# Patient Record
Sex: FEMALE | Race: BLACK OR AFRICAN AMERICAN | NOT HISPANIC OR LATINO | URBAN - METROPOLITAN AREA
[De-identification: names, ages, dates, MRNs, and addresses within clinical notes are randomized per-mention and may not be internally consistent; named-entity substitution may affect disease eponyms.]

---

## 2017-05-16 ENCOUNTER — EMERGENCY (EMERGENCY)
Facility: HOSPITAL | Age: 32
LOS: 1 days | Discharge: HOME | End: 2017-05-16
Admitting: EMERGENCY MEDICINE
Payer: MEDICAID

## 2017-05-16 VITALS
OXYGEN SATURATION: 96 % | RESPIRATION RATE: 18 BRPM | SYSTOLIC BLOOD PRESSURE: 115 MMHG | HEART RATE: 85 BPM | TEMPERATURE: 99 F | DIASTOLIC BLOOD PRESSURE: 62 MMHG

## 2017-05-16 LAB — HCG SERPL-ACNC: < 5 MIU/ML — SIGNIFICANT CHANGE UP

## 2017-05-16 PROCEDURE — 73610 X-RAY EXAM OF ANKLE: CPT | Mod: 26,LT

## 2017-05-16 PROCEDURE — 99284 EMERGENCY DEPT VISIT MOD MDM: CPT

## 2017-05-16 RX ORDER — ACETAMINOPHEN 500 MG
650 TABLET ORAL ONCE
Qty: 0 | Refills: 0 | Status: COMPLETED | OUTPATIENT
Start: 2017-05-16 | End: 2017-05-16

## 2017-05-16 RX ORDER — KETOROLAC TROMETHAMINE 30 MG/ML
15 SYRINGE (ML) INJECTION ONCE
Qty: 0 | Refills: 0 | Status: DISCONTINUED | OUTPATIENT
Start: 2017-05-16 | End: 2017-05-16

## 2017-05-16 RX ADMIN — Medication 650 MILLIGRAM(S): at 13:18

## 2017-05-16 NOTE — ED PROVIDER NOTE - CHPI ED SYMPTOMS NEG
no back pain/no stiffness/no tingling/no bruising/no abrasion/no numbness/no fever/no weakness/no deformity

## 2017-05-16 NOTE — ED PROVIDER NOTE - MEDICAL DECISION MAKING DETAILS
32 yo F here for L ankle pain x two days. +UCG in ED. Pt states LMP was at the end of april and there is no way she could be pregnant. Will do betaHCG to eval. PLAN: beta, XR, meds

## 2017-05-16 NOTE — ED PROVIDER NOTE - PHYSICAL EXAMINATION
L ankle: no swelling mild ttp to medial malleolus NVI sensate intact FROM strength 5/5 able to bear weight with minimal discomfort.

## 2017-05-16 NOTE — ED PROVIDER NOTE - PLAN OF CARE
Use aircast for the next 2-3 days for support. RICE: rest, ice, compression, elevation. Rest, drink plenty of fluids.  Advance activity as tolerated.  Continue all previously prescribed medications as directed. You can use motrin 600mg every 6-8 hours for pain or fever, and/or Tylenol 650 mg every 4 hours for pain/fever. Follow up with your primary care physician in 48-72 hours- bring copies of your results.  Return to the emergency department for chest pain, shortness of breath, dizziness, or worsening, concerning, or persistent symptoms.

## 2017-05-16 NOTE — ED PROVIDER NOTE - PROGRESS NOTE DETAILS
TIFFANIE Bryant: XR neg, HCG serum negative. Will d/c with ankle sprain instructions, PMD follow up, aircast x 2-3 days.

## 2017-05-16 NOTE — ED PROVIDER NOTE - CARE PLAN
Principal Discharge DX:	Ankle sprain  Instructions for follow-up, activity and diet:	Use aircast for the next 2-3 days for support. RICE: rest, ice, compression, elevation. Rest, drink plenty of fluids.  Advance activity as tolerated.  Continue all previously prescribed medications as directed. You can use motrin 600mg every 6-8 hours for pain or fever, and/or Tylenol 650 mg every 4 hours for pain/fever. Follow up with your primary care physician in 48-72 hours- bring copies of your results.  Return to the emergency department for chest pain, shortness of breath, dizziness, or worsening, concerning, or persistent symptoms.

## 2018-10-11 ENCOUNTER — RESULT REVIEW (OUTPATIENT)
Age: 33
End: 2018-10-11

## 2021-12-10 ENCOUNTER — APPOINTMENT (OUTPATIENT)
Dept: OBGYN | Facility: CLINIC | Age: 36
End: 2021-12-10
Payer: COMMERCIAL

## 2021-12-10 VITALS — SYSTOLIC BLOOD PRESSURE: 108 MMHG | DIASTOLIC BLOOD PRESSURE: 71 MMHG | BODY MASS INDEX: 35.12 KG/M2 | WEIGHT: 192 LBS

## 2021-12-10 DIAGNOSIS — Z34.90 ENCOUNTER FOR SUPERVISION OF NORMAL PREGNANCY, UNSPECIFIED, UNSPECIFIED TRIMESTER: ICD-10-CM

## 2021-12-10 PROCEDURE — 76801 OB US < 14 WKS SINGLE FETUS: CPT

## 2021-12-10 PROCEDURE — 99204 OFFICE O/P NEW MOD 45 MIN: CPT

## 2021-12-15 PROBLEM — Z34.90 CURRENTLY PREGNANT: Status: ACTIVE | Noted: 2021-12-10

## 2021-12-15 NOTE — DISCUSSION/SUMMARY
[FreeTextEntry1] : 36  at 11 weeks by LMP found to have missed  \par -patient counseled on options including expectant, medical and surgical management. Risks and benefits of each option reviewed in detail\par -Patient unsure of her decision and will discuss with her partner\par -Infection and bleeding precautions given\par -Patient advised to f/u within 1-2 weeks regarding her decision

## 2021-12-15 NOTE — HISTORY OF PRESENT ILLNESS
[Patient reported PAP Smear was normal] : Patient reported PAP Smear was normal [PapSmeardate] : 11/22/2021 [LMPDate] : 9/21/21 [FreeTextEntry1] :  - , full term, female

## 2021-12-16 ENCOUNTER — OUTPATIENT (OUTPATIENT)
Dept: OUTPATIENT SERVICES | Facility: HOSPITAL | Age: 36
LOS: 1 days | End: 2021-12-16

## 2021-12-16 ENCOUNTER — TRANSCRIPTION ENCOUNTER (OUTPATIENT)
Age: 36
End: 2021-12-16

## 2021-12-16 VITALS
OXYGEN SATURATION: 98 % | HEIGHT: 62 IN | SYSTOLIC BLOOD PRESSURE: 103 MMHG | DIASTOLIC BLOOD PRESSURE: 61 MMHG | RESPIRATION RATE: 14 BRPM | WEIGHT: 188.05 LBS | HEART RATE: 70 BPM | TEMPERATURE: 97 F

## 2021-12-16 VITALS
TEMPERATURE: 97 F | WEIGHT: 188.05 LBS | SYSTOLIC BLOOD PRESSURE: 103 MMHG | RESPIRATION RATE: 14 BRPM | HEIGHT: 62 IN | DIASTOLIC BLOOD PRESSURE: 61 MMHG | HEART RATE: 70 BPM | OXYGEN SATURATION: 98 %

## 2021-12-16 DIAGNOSIS — O02.1 MISSED ABORTION: ICD-10-CM

## 2021-12-16 DIAGNOSIS — Z98.890 OTHER SPECIFIED POSTPROCEDURAL STATES: Chronic | ICD-10-CM

## 2021-12-16 LAB
HCT VFR BLD CALC: 38.2 % — SIGNIFICANT CHANGE UP (ref 34.5–45)
HGB BLD-MCNC: 12.6 G/DL — SIGNIFICANT CHANGE UP (ref 11.5–15.5)
MCHC RBC-ENTMCNC: 30 PG — SIGNIFICANT CHANGE UP (ref 27–34)
MCHC RBC-ENTMCNC: 33 GM/DL — SIGNIFICANT CHANGE UP (ref 32–36)
MCV RBC AUTO: 91 FL — SIGNIFICANT CHANGE UP (ref 80–100)
NRBC # BLD: 0 /100 WBCS — SIGNIFICANT CHANGE UP
NRBC # FLD: 0 K/UL — SIGNIFICANT CHANGE UP
PLATELET # BLD AUTO: 204 K/UL — SIGNIFICANT CHANGE UP (ref 150–400)
RBC # BLD: 4.2 M/UL — SIGNIFICANT CHANGE UP (ref 3.8–5.2)
RBC # FLD: 13.4 % — SIGNIFICANT CHANGE UP (ref 10.3–14.5)
WBC # BLD: 4.99 K/UL — SIGNIFICANT CHANGE UP (ref 3.8–10.5)
WBC # FLD AUTO: 4.99 K/UL — SIGNIFICANT CHANGE UP (ref 3.8–10.5)

## 2021-12-16 RX ORDER — SODIUM CHLORIDE 9 MG/ML
3 INJECTION INTRAMUSCULAR; INTRAVENOUS; SUBCUTANEOUS ONCE
Refills: 0 | Status: DISCONTINUED | OUTPATIENT
Start: 2021-12-17 | End: 2022-01-01

## 2021-12-16 RX ORDER — SODIUM CHLORIDE 9 MG/ML
1000 INJECTION, SOLUTION INTRAVENOUS
Refills: 0 | Status: DISCONTINUED | OUTPATIENT
Start: 2021-12-17 | End: 2022-01-01

## 2021-12-16 NOTE — H&P PST ADULT - ATTENDING COMMENTS
OB Attending    P2 presenting for DVC for MAB.   Written informed consent obtained prior to procedure.     IMAN Flores MD

## 2021-12-16 NOTE — H&P PST ADULT - PROBLEM SELECTOR PLAN 1
Assessment and Plan: Patient scheduled for surgery on 12/17/21  Patient provided with verbal and written presurgical instructions; verbalized understanding  with teach back.    Patient confirmed  COVID appointment yesterday

## 2021-12-16 NOTE — H&P PST ADULT - HISTORY OF PRESENT ILLNESS
36 yr old female presents with preop dx missed  scheduled for dilation vacuum curettage, reports no fetal heart beat noted at 11 week sono, gestation age estimated 9 weeks

## 2021-12-16 NOTE — H&P PST ADULT - NSICDXFAMILYHX_GEN_ALL_CORE_FT
FAMILY HISTORY:  Father  Still living? No  Family history of diabetes mellitus (DM), Age at diagnosis: Age Unknown  FH: heart attack, Age at diagnosis: Age Unknown  FH: heart disease, Age at diagnosis: Age Unknown  FH: pancreatic cancer, Age at diagnosis: Age Unknown  FH: stroke, Age at diagnosis: Age Unknown    Mother  Still living? Yes, Estimated age: Age Unknown  Family history of diabetes mellitus (DM), Age at diagnosis: Age Unknown    Sibling  Still living? Yes, Estimated age: Age Unknown  FH: breast cancer, Age at diagnosis: Age Unknown    Grandparent  Still living? No  FH: pancreatic cancer, Age at diagnosis: Age Unknown

## 2021-12-16 NOTE — H&P PST ADULT - NSICDXPASTMEDICALHX_GEN_ALL_CORE_FT
PAST MEDICAL HISTORY:  HSV-1 (herpes simplex virus 1) infection     HSV-2 infection     Missed

## 2021-12-16 NOTE — H&P PST ADULT - NSANTHOSAYNRD_GEN_A_CORE
No. REMBERTO screening performed.  STOP BANG Legend: 0-2 = LOW Risk; 3-4 = INTERMEDIATE Risk; 5-8 = HIGH Risk

## 2021-12-16 NOTE — ASU PREOPERATIVE ASSESSMENT, ADULT ([facility]ARK ONLY) - FALL HARM RISK - UNIVERSAL INTERVENTIONS
Bed in lowest position, wheels locked, appropriate side rails in place/Call bell, personal items and telephone in reach/Instruct patient to call for assistance before getting out of bed or chair/Non-slip footwear when patient is out of bed/[place] to call system/Physically safe environment - no spills, clutter or unnecessary equipment/Purposeful Proactive Rounding/Room/bathroom lighting operational, light cord in reach

## 2021-12-17 ENCOUNTER — RESULT REVIEW (OUTPATIENT)
Age: 36
End: 2021-12-17

## 2021-12-17 ENCOUNTER — OUTPATIENT (OUTPATIENT)
Dept: OUTPATIENT SERVICES | Facility: HOSPITAL | Age: 36
LOS: 1 days | End: 2021-12-17
Payer: COMMERCIAL

## 2021-12-17 ENCOUNTER — APPOINTMENT (OUTPATIENT)
Dept: OBGYN | Facility: HOSPITAL | Age: 36
End: 2021-12-17

## 2021-12-17 ENCOUNTER — OUTPATIENT (OUTPATIENT)
Dept: OUTPATIENT SERVICES | Facility: HOSPITAL | Age: 36
LOS: 1 days | Discharge: HOME | End: 2021-12-17
Payer: COMMERCIAL

## 2021-12-17 VITALS
RESPIRATION RATE: 14 BRPM | OXYGEN SATURATION: 100 % | DIASTOLIC BLOOD PRESSURE: 57 MMHG | HEART RATE: 81 BPM | SYSTOLIC BLOOD PRESSURE: 109 MMHG

## 2021-12-17 DIAGNOSIS — O02.1 MISSED ABORTION: ICD-10-CM

## 2021-12-17 DIAGNOSIS — Z98.890 OTHER SPECIFIED POSTPROCEDURAL STATES: Chronic | ICD-10-CM

## 2021-12-17 LAB
BLD GP AB SCN SERPL QL: NEGATIVE — SIGNIFICANT CHANGE UP
RH IG SCN BLD-IMP: POSITIVE — SIGNIFICANT CHANGE UP

## 2021-12-17 PROCEDURE — 88305 TISSUE EXAM BY PATHOLOGIST: CPT | Mod: 26

## 2021-12-17 PROCEDURE — 88264 CHROMOSOME ANALYSIS 20-25: CPT

## 2021-12-17 PROCEDURE — 88233 TISSUE CULTURE SKIN/BIOPSY: CPT

## 2021-12-17 PROCEDURE — 59840 INDUCED ABORTION D&C: CPT

## 2021-12-17 PROCEDURE — 88280 CHROMOSOME KARYOTYPE STUDY: CPT

## 2021-12-17 RX ORDER — FAMOTIDINE 10 MG/ML
20 INJECTION INTRAVENOUS
Qty: 0 | Refills: 0 | DISCHARGE

## 2021-12-17 RX ORDER — SODIUM CHLORIDE 9 MG/ML
1000 INJECTION, SOLUTION INTRAVENOUS
Refills: 0 | Status: DISCONTINUED | OUTPATIENT
Start: 2021-12-17 | End: 2022-01-01

## 2021-12-17 NOTE — BRIEF OPERATIVE NOTE - NSICDXBRIEFPROCEDURE_GEN_ALL_CORE_FT
PROCEDURES:  D&C, uterus, therapeutic, for incomplete, missed, septic, or induced  17-Dec-2021 16:29:51  Sample-Maria Esther Patino

## 2021-12-17 NOTE — ASU DISCHARGE PLAN (ADULT/PEDIATRIC) - NS MD DC FALL RISK RISK
For information on Fall & Injury Prevention, visit: [email or web address] OR  [email or web address] OR  https://www.cdc.gov/steadi/patient.html

## 2021-12-17 NOTE — ASU DISCHARGE PLAN (ADULT/PEDIATRIC) - NURSING INSTRUCTIONS
You received IV Tylenol for pain management at 3:55pm. Please DO NOT take any Tylenol (Acetaminophen) containing products, such as Vicodin, Percocet, Excedrin, and cold medications for the next 6 hours (until 9:55pm). DO NOT TAKE MORE THAN 3000 MG OF TYLENOL in a 24 hour period.     You received IV Toradol for pain management at 4:15pm. Please DO NOT take Motrin/Ibuprofen/Advil/Aleve/NSAIDs (Non-Steroidal Anti-Inflammatory Drugs) for the next 6 hours (until 10:15 PM).

## 2021-12-17 NOTE — ASU DISCHARGE PLAN (ADULT/PEDIATRIC) - CARE PROVIDER_API CALL
Maria Esther Monteiro; MPH)  Erica MCCOY  [address], [address]  [place] [address]  Phone: [phone]  Fax: [phone]  Follow Up Time:

## 2021-12-21 LAB — SURGICAL PATHOLOGY STUDY: SIGNIFICANT CHANGE UP

## 2021-12-28 PROBLEM — O02.1 MISSED ABORTION: Chronic | Status: ACTIVE | Noted: 2021-12-16

## 2022-01-03 ENCOUNTER — APPOINTMENT (OUTPATIENT)
Dept: OBGYN | Facility: CLINIC | Age: 37
End: 2022-01-03
Payer: COMMERCIAL

## 2022-01-03 ENCOUNTER — TRANSCRIPTION ENCOUNTER (OUTPATIENT)
Age: 37
End: 2022-01-03

## 2022-01-03 VITALS — SYSTOLIC BLOOD PRESSURE: 125 MMHG | DIASTOLIC BLOOD PRESSURE: 86 MMHG | WEIGHT: 192 LBS | BODY MASS INDEX: 35.12 KG/M2

## 2022-01-03 DIAGNOSIS — O02.1 MISSED ABORTION: ICD-10-CM

## 2022-01-03 DIAGNOSIS — Z98.890 OTHER SPECIFIED POSTPROCEDURAL STATES: ICD-10-CM

## 2022-01-03 PROCEDURE — 99024 POSTOP FOLLOW-UP VISIT: CPT

## 2022-01-04 PROBLEM — Z98.890 POST-OPERATIVE STATE: Status: ACTIVE | Noted: 2022-01-04

## 2022-01-04 PROBLEM — O02.1 MISSED ABORTION: Status: ACTIVE | Noted: 2022-01-04

## 2022-01-04 NOTE — PLAN
[FreeTextEntry1] : 35 y/o F presenting for post operative check, s/p MAB\par -Patient recovering well\par -She is cleared to resume normal activity including sexual intercourse and exercise\par -Patient given information to contact [facility] for preconception counseling given poor OB history\par -will call patient once genetics are resulted\par -patient not ready to conceive but declines contraception at this time\par -f/u for annual visit

## 2022-01-04 NOTE — HISTORY OF PRESENT ILLNESS
[Pain is well-controlled] : pain is well-controlled [None] : no vaginal bleeding [Normal] : normal [de-identified] : Patient is a 37 y/o F s/p DVC on  for Missed . Patient is feeling well today. Pain well controlled, ambulating, voiding, tolerating PO. Denies subjective fevers and chills. Patient feeling emotionally well. \par \par Pathology Reviewed: Products of Conception\par Genetics: Pending

## 2022-01-10 LAB — CHROM ANALY OVERALL INTERP SPEC-IMP: SIGNIFICANT CHANGE UP

## 2022-05-18 ENCOUNTER — APPOINTMENT (OUTPATIENT)
Dept: ANTEPARTUM | Facility: CLINIC | Age: 37
End: 2022-05-18

## 2022-05-18 ENCOUNTER — ASOB RESULT (OUTPATIENT)
Age: 37
End: 2022-05-18

## 2022-05-18 ENCOUNTER — APPOINTMENT (OUTPATIENT)
Dept: MATERNAL FETAL MEDICINE | Facility: CLINIC | Age: 37
End: 2022-05-18
Payer: COMMERCIAL

## 2022-05-18 PROCEDURE — 99203 OFFICE O/P NEW LOW 30 MIN: CPT

## 2022-05-18 PROCEDURE — 36416 COLLJ CAPILLARY BLOOD SPEC: CPT

## 2022-05-18 PROCEDURE — 99213 OFFICE O/P EST LOW 20 MIN: CPT

## 2022-05-18 PROCEDURE — 76813 OB US NUCHAL MEAS 1 GEST: CPT

## 2022-05-18 PROCEDURE — 76801 OB US < 14 WKS SINGLE FETUS: CPT | Mod: 59

## 2022-06-15 ENCOUNTER — ASOB RESULT (OUTPATIENT)
Age: 37
End: 2022-06-15

## 2022-06-15 ENCOUNTER — APPOINTMENT (OUTPATIENT)
Dept: ANTEPARTUM | Facility: CLINIC | Age: 37
End: 2022-06-15
Payer: COMMERCIAL

## 2022-06-15 PROCEDURE — 76815 OB US LIMITED FETUS(S): CPT

## 2022-06-15 PROCEDURE — 76817 TRANSVAGINAL US OBSTETRIC: CPT

## 2022-06-28 ENCOUNTER — ASOB RESULT (OUTPATIENT)
Age: 37
End: 2022-06-28

## 2022-06-28 ENCOUNTER — APPOINTMENT (OUTPATIENT)
Dept: ANTEPARTUM | Facility: CLINIC | Age: 37
End: 2022-06-28

## 2022-06-28 PROCEDURE — 76817 TRANSVAGINAL US OBSTETRIC: CPT

## 2022-06-28 PROCEDURE — 76815 OB US LIMITED FETUS(S): CPT

## 2022-07-05 ENCOUNTER — APPOINTMENT (OUTPATIENT)
Dept: ANTEPARTUM | Facility: CLINIC | Age: 37
End: 2022-07-05

## 2022-07-05 ENCOUNTER — ASOB RESULT (OUTPATIENT)
Age: 37
End: 2022-07-05

## 2022-07-05 PROCEDURE — 76811 OB US DETAILED SNGL FETUS: CPT

## 2022-07-05 PROCEDURE — 76817 TRANSVAGINAL US OBSTETRIC: CPT

## 2022-07-08 ENCOUNTER — APPOINTMENT (OUTPATIENT)
Dept: MATERNAL FETAL MEDICINE | Facility: CLINIC | Age: 37
End: 2022-07-08

## 2022-07-21 ENCOUNTER — APPOINTMENT (OUTPATIENT)
Dept: ANTEPARTUM | Facility: CLINIC | Age: 37
End: 2022-07-21

## 2022-07-21 ENCOUNTER — ASOB RESULT (OUTPATIENT)
Age: 37
End: 2022-07-21

## 2022-07-21 PROCEDURE — 76817 TRANSVAGINAL US OBSTETRIC: CPT

## 2022-07-21 PROCEDURE — 76815 OB US LIMITED FETUS(S): CPT

## 2022-08-04 ENCOUNTER — ASOB RESULT (OUTPATIENT)
Age: 37
End: 2022-08-04

## 2022-08-04 ENCOUNTER — APPOINTMENT (OUTPATIENT)
Dept: ANTEPARTUM | Facility: CLINIC | Age: 37
End: 2022-08-04

## 2022-08-04 PROCEDURE — 76816 OB US FOLLOW-UP PER FETUS: CPT

## 2022-08-30 ENCOUNTER — ASOB RESULT (OUTPATIENT)
Age: 37
End: 2022-08-30

## 2022-08-30 ENCOUNTER — APPOINTMENT (OUTPATIENT)
Dept: ANTEPARTUM | Facility: CLINIC | Age: 37
End: 2022-08-30

## 2022-08-30 PROCEDURE — 76819 FETAL BIOPHYS PROFIL W/O NST: CPT

## 2022-08-30 PROCEDURE — 76816 OB US FOLLOW-UP PER FETUS: CPT

## 2022-09-08 ENCOUNTER — APPOINTMENT (OUTPATIENT)
Dept: ANTEPARTUM | Facility: CLINIC | Age: 37
End: 2022-09-08

## 2022-09-08 ENCOUNTER — ASOB RESULT (OUTPATIENT)
Age: 37
End: 2022-09-08

## 2022-09-08 PROCEDURE — 76819 FETAL BIOPHYS PROFIL W/O NST: CPT

## 2022-09-14 ENCOUNTER — APPOINTMENT (OUTPATIENT)
Dept: ANTEPARTUM | Facility: CLINIC | Age: 37
End: 2022-09-14

## 2022-09-14 ENCOUNTER — ASOB RESULT (OUTPATIENT)
Age: 37
End: 2022-09-14

## 2022-09-14 PROCEDURE — 76819 FETAL BIOPHYS PROFIL W/O NST: CPT

## 2022-09-21 ENCOUNTER — APPOINTMENT (OUTPATIENT)
Dept: ANTEPARTUM | Facility: CLINIC | Age: 37
End: 2022-09-21

## 2022-09-21 ENCOUNTER — ASOB RESULT (OUTPATIENT)
Age: 37
End: 2022-09-21

## 2022-09-21 ENCOUNTER — APPOINTMENT (OUTPATIENT)
Dept: MATERNAL FETAL MEDICINE | Facility: CLINIC | Age: 37
End: 2022-09-21

## 2022-09-21 PROCEDURE — 76816 OB US FOLLOW-UP PER FETUS: CPT | Mod: 59

## 2022-09-21 PROCEDURE — 76819 FETAL BIOPHYS PROFIL W/O NST: CPT

## 2022-09-21 PROCEDURE — 99213 OFFICE O/P EST LOW 20 MIN: CPT | Mod: 25

## 2022-09-28 ENCOUNTER — APPOINTMENT (OUTPATIENT)
Dept: ANTEPARTUM | Facility: CLINIC | Age: 37
End: 2022-09-28

## 2022-09-28 ENCOUNTER — ASOB RESULT (OUTPATIENT)
Age: 37
End: 2022-09-28

## 2022-09-28 PROCEDURE — 76818 FETAL BIOPHYS PROFILE W/NST: CPT

## 2022-10-04 ENCOUNTER — ASOB RESULT (OUTPATIENT)
Age: 37
End: 2022-10-04

## 2022-10-04 ENCOUNTER — APPOINTMENT (OUTPATIENT)
Dept: ANTEPARTUM | Facility: CLINIC | Age: 37
End: 2022-10-04

## 2022-10-04 PROCEDURE — 76818 FETAL BIOPHYS PROFILE W/NST: CPT

## 2022-10-11 ENCOUNTER — APPOINTMENT (OUTPATIENT)
Dept: ANTEPARTUM | Facility: CLINIC | Age: 37
End: 2022-10-11

## 2022-10-11 ENCOUNTER — ASOB RESULT (OUTPATIENT)
Age: 37
End: 2022-10-11

## 2022-10-11 PROCEDURE — 76818 FETAL BIOPHYS PROFILE W/NST: CPT

## 2022-10-11 PROCEDURE — 76816 OB US FOLLOW-UP PER FETUS: CPT | Mod: 59

## 2022-10-18 ENCOUNTER — ASOB RESULT (OUTPATIENT)
Age: 37
End: 2022-10-18

## 2022-10-18 ENCOUNTER — APPOINTMENT (OUTPATIENT)
Dept: ANTEPARTUM | Facility: CLINIC | Age: 37
End: 2022-10-18

## 2022-10-18 PROCEDURE — 76818 FETAL BIOPHYS PROFILE W/NST: CPT

## 2022-10-25 ENCOUNTER — APPOINTMENT (OUTPATIENT)
Dept: ANTEPARTUM | Facility: CLINIC | Age: 37
End: 2022-10-25

## 2022-10-25 ENCOUNTER — ASOB RESULT (OUTPATIENT)
Age: 37
End: 2022-10-25

## 2022-10-25 PROCEDURE — 99203 OFFICE O/P NEW LOW 30 MIN: CPT | Mod: 25

## 2022-10-25 PROCEDURE — 99213 OFFICE O/P EST LOW 20 MIN: CPT | Mod: 25

## 2022-10-25 PROCEDURE — XXXXX: CPT

## 2022-10-25 PROCEDURE — 76818 FETAL BIOPHYS PROFILE W/NST: CPT

## 2022-11-01 ENCOUNTER — ASOB RESULT (OUTPATIENT)
Age: 37
End: 2022-11-01

## 2022-11-01 ENCOUNTER — APPOINTMENT (OUTPATIENT)
Dept: ANTEPARTUM | Facility: CLINIC | Age: 37
End: 2022-11-01

## 2022-11-01 PROCEDURE — 76816 OB US FOLLOW-UP PER FETUS: CPT | Mod: 59

## 2022-11-01 PROCEDURE — 76818 FETAL BIOPHYS PROFILE W/NST: CPT

## 2022-11-04 ENCOUNTER — OUTPATIENT (OUTPATIENT)
Dept: INPATIENT UNIT | Facility: HOSPITAL | Age: 37
LOS: 1 days | End: 2022-11-04

## 2022-11-04 ENCOUNTER — ASOB RESULT (OUTPATIENT)
Age: 37
End: 2022-11-04

## 2022-11-04 ENCOUNTER — APPOINTMENT (OUTPATIENT)
Dept: ANTEPARTUM | Facility: CLINIC | Age: 37
End: 2022-11-04

## 2022-11-04 VITALS — SYSTOLIC BLOOD PRESSURE: 129 MMHG | DIASTOLIC BLOOD PRESSURE: 79 MMHG | HEART RATE: 105 BPM

## 2022-11-04 VITALS — OXYGEN SATURATION: 100 % | HEART RATE: 102 BPM

## 2022-11-04 DIAGNOSIS — Z98.890 OTHER SPECIFIED POSTPROCEDURAL STATES: Chronic | ICD-10-CM

## 2022-11-04 LAB
BLD GP AB SCN SERPL QL: NEGATIVE — SIGNIFICANT CHANGE UP
HCT VFR BLD CALC: 32.1 % — LOW (ref 34.5–45)
HGB BLD-MCNC: 10.8 G/DL — LOW (ref 11.5–15.5)
MCHC RBC-ENTMCNC: 29.6 PG — SIGNIFICANT CHANGE UP (ref 27–34)
MCHC RBC-ENTMCNC: 33.6 GM/DL — SIGNIFICANT CHANGE UP (ref 32–36)
MCV RBC AUTO: 87.9 FL — SIGNIFICANT CHANGE UP (ref 80–100)
NRBC # BLD: 0 /100 WBCS — SIGNIFICANT CHANGE UP (ref 0–0)
NRBC # FLD: 0 K/UL — SIGNIFICANT CHANGE UP (ref 0–0)
PLATELET # BLD AUTO: 201 K/UL — SIGNIFICANT CHANGE UP (ref 150–400)
RBC # BLD: 3.65 M/UL — LOW (ref 3.8–5.2)
RBC # FLD: 13.6 % — SIGNIFICANT CHANGE UP (ref 10.3–14.5)
RH IG SCN BLD-IMP: POSITIVE — SIGNIFICANT CHANGE UP
WBC # BLD: 6.88 K/UL — SIGNIFICANT CHANGE UP (ref 3.8–10.5)
WBC # FLD AUTO: 6.88 K/UL — SIGNIFICANT CHANGE UP (ref 3.8–10.5)

## 2022-11-04 PROCEDURE — 59412 ANTEPARTUM MANIPULATION: CPT

## 2022-11-04 PROCEDURE — 76819 FETAL BIOPHYS PROFIL W/O NST: CPT | Mod: 26

## 2022-11-04 RX ORDER — SODIUM CHLORIDE 9 MG/ML
1000 INJECTION, SOLUTION INTRAVENOUS
Refills: 0 | Status: DISCONTINUED | OUTPATIENT
Start: 2022-11-04 | End: 2022-11-04

## 2022-11-04 RX ADMIN — Medication 0.25 MILLIGRAM(S): at 13:56

## 2022-11-04 NOTE — OB PROVIDER H&P - NS_OBGYNHISTORY_OBGYN_ALL_OB_FT
2008, , full term, [id number], uncomplicated  2010,  at 26 weeks, spontaneous  labor, [id number]  2020, , at 26 weeks, fetal demise 2008, , full term, [id number], uncomplicated  2010,  at 26 weeks, spontaneous  labor, [id number]  2020, , at 26 weeks, fetal demise  2021, SAB at 8 weeks, D&C

## 2022-11-04 NOTE — OB PROVIDER H&P - ATTENDING COMMENTS
MFM ATTENDING ATTESTATION    I personally discussed with the patient the risks, benefits and alternatives of an external cephalic version. Risks include but are not limited to the onset of labor, rupture of membranes, cord accident, placental abruption, uterine rupture, fetal distress. Occurrence of these may or may not be an indication for urgent or emergent  delivery. We also discussed that even without these complications, the version may not be successful, in which case  delivery would be indicated. We also discussed that even without these complications, if the version was successful, the fetus could revert back to a malpresentation either during expectant management of her pregnancy or with induction of labor.    Patient and partner had all of their questions answered to their satisfaction.     Given history of IUFD at 26 weeks, recommend delivery [id number] and weekly testing from now until delivery.

## 2022-11-04 NOTE — OB PROVIDER H&P - HISTORY OF PRESENT ILLNESS
37 yo , EGA@37.1 weeks presented for scheduled external cephalic version. Patient denies contractions, leaking fluid, vaginal bleeding, stated positive fetal movements.

## 2022-11-04 NOTE — OB PROVIDER H&P - ASSESSMENT
37 yo , EGA@37.1 weeks, presented for scheduled external cephalic version.   Patient denies contractions, reports  + fetal movements,  denies leaking of fluid, denies vaginal bleeding. Pt denies any other concerns.  Patient denies symptoms of COVID 19, denies recent illness, fully vaccinated.  Prenatal care with Dr Glass.   Prenatal course is significant for breech presentation.   GBS status is unknown.    OB hx: 2008, , full term, [id number], uncomplicated            2010,  at 26 weeks, spontaneous  labor, [id number]            2020, , at 26 weeks, fetal demise  Med hx: denies hx clotting or bleeding disorders HTN, DM  Surg hx: denies  GYN hx: denies hx abnormal Papsmear, STIs, fibroids, polyps, cysts  Family hx: no hx congential disorders, bleeding/clotting disorders  Psych hx: denies anxiety/depression   Social hx: denies ETOH, smoking, drugs. Safe at home, lives with 2 kids    Meds: PNV qd, stopped vaginal Progesterone at 26 weeks  No Known Allergies    – Will accept blood transfusions? Yes    T(C): --  HR: --  BP: --  RR: --  SpO2: --    – PE:   CV: RRR  Pulm: breathing comfortably on RA  Abd: gravid, nontender  Extr: moving all extremities with ease  – Sono: breech, AAFV    A: 37 yo, , EGA@37.1 weeks, scheduled for external cephalic version.  P: admitted for ECV, NST, terbutaline as per Dr Mcintosh.    Nida Garcia CNM     35 yo , EGA@37.1 weeks, presented for scheduled external cephalic version.   Patient denies contractions, reports  + fetal movements,  denies leaking of fluid, denies vaginal bleeding. Pt denies any other concerns.  Patient denies symptoms of COVID 19, denies recent illness, fully vaccinated.  Prenatal care with Dr Glass.   Prenatal course is significant for breech presentation.   GBS status is unknown.    OB hx: 2008, , full term, [id number], uncomplicated            2010,  at 26 weeks, spontaneous  labor, [id number]            2020, , at 26 weeks, fetal demise            2021, SAB, 8 weeks, D&C  Med hx: denies hx clotting or bleeding disorders HTN, DM  Surg hx: denies  GYN hx: denies hx abnormal Papsmear, STIs, fibroids, polyps, cysts  Family hx: no hx congential disorders, bleeding/clotting disorders  Psych hx: denies anxiety/depression   Social hx: denies ETOH, smoking, drugs. Safe at home, lives with 2 kids    Meds: PNV qd, stopped vaginal Progesterone at 26 weeks  No Known Allergies    – Will accept blood transfusions? Yes    T(C): --  HR: --  BP: --  RR: --  SpO2: --    – PE:   CV: RRR  Pulm: breathing comfortably on RA  Abd: gravid, nontender  Extr: moving all extremities with ease  – Sono: breech, AAFV    A: 35 yo, , EGA@37.1 weeks, scheduled for external cephalic version.  P: admitted for ECV, NST, terbutaline as per Dr Mcintosh.    Nida Garcia CNM     35 yo , EGA@37.1 weeks, presented for scheduled external cephalic version.   Patient denies contractions, reports  + fetal movements,  denies leaking of fluid, denies vaginal bleeding. Pt denies any other concerns.  Patient denies symptoms of COVID 19, denies recent illness, fully vaccinated.  Prenatal care with Dr Glass.   Prenatal course is significant for breech presentation.   GBS status is unknown.    OB hx: 2008, , full term, 3ffn4at, uncomplicated            2010,  at 26 weeks, spontaneous  labor, [id number]            2020, , at 26 weeks, fetal demise            2021, SAB, 8 weeks, D&C  Med hx: denies hx clotting or bleeding disorders HTN, DM  Surg hx: denies  GYN hx: denies hx abnormal Papsmear, STIs, fibroids, polyps, cysts  Family hx: no hx congential disorders, bleeding/clotting disorders  Psych hx: denies anxiety/depression   Social hx: denies ETOH, smoking, drugs. Safe at home, lives with 2 kids    Meds: PNV qd, stopped vaginal Progesterone at 26 weeks  No Known Allergies    – Will accept blood transfusions? Yes    T(C): 36.9  HR: 105 bpm  BP: 129/79  RR: 16    – PE:   CV: RRR  Pulm: breathing comfortably on RA  Abd: gravid, nontender  Extr: moving all extremities with ease  – Sono: breech, AAFV    A: 35 yo, , EGA@37.1 weeks, scheduled for external cephalic version.  P: admitted for ECV, NST, terbutaline as per Dr Mcintosh.    Nida Garcia CNM

## 2022-11-04 NOTE — OB RN TRIAGE NOTE - NS_OBGYNHISTORY_OBGYN_ALL_OB_FT
2008, , full term, [id number], uncomplicated  2010,  at 26 weeks, spontaneous  labor, [id number]  2020, , at 26 weeks, fetal demise  2021, SAB at 8 weeks, D&C

## 2022-11-05 LAB — T PALLIDUM AB TITR SER: NEGATIVE — SIGNIFICANT CHANGE UP

## 2022-11-08 ENCOUNTER — ASOB RESULT (OUTPATIENT)
Age: 37
End: 2022-11-08

## 2022-11-08 ENCOUNTER — APPOINTMENT (OUTPATIENT)
Dept: ANTEPARTUM | Facility: CLINIC | Age: 37
End: 2022-11-08

## 2022-11-08 PROCEDURE — 76818 FETAL BIOPHYS PROFILE W/NST: CPT

## 2022-11-14 ENCOUNTER — OUTPATIENT (OUTPATIENT)
Dept: OUTPATIENT SERVICES | Facility: HOSPITAL | Age: 37
LOS: 1 days | End: 2022-11-14

## 2022-11-14 VITALS
SYSTOLIC BLOOD PRESSURE: 114 MMHG | RESPIRATION RATE: 16 BRPM | HEART RATE: 88 BPM | HEIGHT: 63 IN | DIASTOLIC BLOOD PRESSURE: 78 MMHG | WEIGHT: 225.09 LBS | OXYGEN SATURATION: 98 % | TEMPERATURE: 97 F

## 2022-11-14 DIAGNOSIS — Z98.890 OTHER SPECIFIED POSTPROCEDURAL STATES: Chronic | ICD-10-CM

## 2022-11-14 LAB
APPEARANCE UR: CLEAR — SIGNIFICANT CHANGE UP
BILIRUB UR-MCNC: NEGATIVE — SIGNIFICANT CHANGE UP
BLD GP AB SCN SERPL QL: NEGATIVE — SIGNIFICANT CHANGE UP
COLOR SPEC: YELLOW — SIGNIFICANT CHANGE UP
DIFF PNL FLD: NEGATIVE — SIGNIFICANT CHANGE UP
GLUCOSE UR QL: NEGATIVE — SIGNIFICANT CHANGE UP
HCT VFR BLD CALC: 33.1 % — LOW (ref 34.5–45)
HGB BLD-MCNC: 11 G/DL — LOW (ref 11.5–15.5)
KETONES UR-MCNC: NEGATIVE — SIGNIFICANT CHANGE UP
LEUKOCYTE ESTERASE UR-ACNC: ABNORMAL
MCHC RBC-ENTMCNC: 29.9 PG — SIGNIFICANT CHANGE UP (ref 27–34)
MCHC RBC-ENTMCNC: 33.2 GM/DL — SIGNIFICANT CHANGE UP (ref 32–36)
MCV RBC AUTO: 89.9 FL — SIGNIFICANT CHANGE UP (ref 80–100)
NITRITE UR-MCNC: NEGATIVE — SIGNIFICANT CHANGE UP
NRBC # BLD: 0 /100 WBCS — SIGNIFICANT CHANGE UP (ref 0–0)
NRBC # FLD: 0 K/UL — SIGNIFICANT CHANGE UP (ref 0–0)
PH UR: 6 — SIGNIFICANT CHANGE UP (ref 5–8)
PLATELET # BLD AUTO: 211 K/UL — SIGNIFICANT CHANGE UP (ref 150–400)
PROT UR-MCNC: ABNORMAL
RBC # BLD: 3.68 M/UL — LOW (ref 3.8–5.2)
RBC # FLD: 14.6 % — HIGH (ref 10.3–14.5)
RH IG SCN BLD-IMP: POSITIVE — SIGNIFICANT CHANGE UP
SP GR SPEC: 1.02 — SIGNIFICANT CHANGE UP (ref 1.01–1.05)
UROBILINOGEN FLD QL: SIGNIFICANT CHANGE UP
WBC # BLD: 7.36 K/UL — SIGNIFICANT CHANGE UP (ref 3.8–10.5)
WBC # FLD AUTO: 7.36 K/UL — SIGNIFICANT CHANGE UP (ref 3.8–10.5)

## 2022-11-14 RX ORDER — ACETAMINOPHEN 500 MG
3 TABLET ORAL
Qty: 0 | Refills: 0 | DISCHARGE

## 2022-11-14 RX ORDER — IBUPROFEN 200 MG
1 TABLET ORAL
Qty: 0 | Refills: 0 | DISCHARGE

## 2022-11-14 NOTE — OB PST NOTE - NSHPPHYSICALEXAM_GEN_ALL_CORE
Constitutional: well developed, well groomed, well nourished, no distress    Eyes: PERRL, EOMI, conjunctiva clear    Ears: normal    Mouth and Gums: normal, moist    Pharynx: no tenderness, discharge    Tonsils: no redness, discharge, tenderness, or swelling    Neck: supple, no JVD    Breast: normal shape    Back: normal shape, ROM intact, strength intact    Respiratory: airway patent, breast sounds equal, good air movement, respiration non-labored, clear to auscultation bilaterally, no chest wall tenderness, no wheezes, rhonchi, or rales    Cardiovascular: regular rate and rhythm    Gastrointestinal: gravid abdomen, non tender, normal bowel sounds    Extremities: no clubbing, cyanosis, or pedal edema    Vascular: carotid pulse normal, radial pulse normal    Neurological: alert and oriented x3, sensation intact, normal strength    Skin: warm and dry, normal color    Lymph nodes: no anterior cervical lymphadenopathy    Musculoskeletal: ROM intact, normal strength, no joint swelling, warmth, or calf tenderness    Psychiatric: normal affect, normal behavior

## 2022-11-14 NOTE — OB PST NOTE - BP NONINVASIVE DIASTOLIC (MM HG)
----- Message from Varun Foster MD sent at 4/9/2019 11:35 AM CDT -----  Please inform the patient his blood counts, kidney function, liver function, blood sugars, and cholesterol panel all look great. He should continue to stay physically active, continue to try to have 5 servings of fruits and vegetables every day while continuing to be a nonsmoker, and minimizing alcohol and soda. With these levels so outstanding we will continue to see him every year, and recheck labs every 2-4 years.   78

## 2022-11-14 NOTE — OB PST NOTE - PROBLEM SELECTOR PLAN 1
Patient tentatively scheduled for  Caesarean section - Breech Presentation on 11/23/2022.  Pre-op instructions provided. Pt given verbal and written instructions with teach back on chlorhexidine wash . Pt verbalized understanding with return demonstration.    Pt strongly advised  for  COVID testing requirements to be done  3- 5 days prior to procedure. Pt was provided with information for covid testing locations in written form.   Pt verbalized understanding with return demonstration     Pt advised  to follow OB for all medication instruction.  Pt instructed to watch video for pain management  and drink regular Gatorade prior  to coming to the hospital .  Pt instructed to do covid test for partner accompanying pt to the hospital 3- 5 days prior to procedure.

## 2022-11-14 NOTE — OB PST NOTE - NSICDXPASTMEDICALHX_GEN_ALL_CORE_FT
PAST MEDICAL HISTORY:  Fetus or  affected by breech delivery and extraction     HSV-1 (herpes simplex virus 1) infection     HSV-2 infection     Missed

## 2022-11-14 NOTE — OB PST NOTE - HISTORY OF PRESENT ILLNESS
37  year old pregnant female presents to presurgical testing scheduled for  Caesarean section - Breech Presentation .   Patient denies vaginal  bleeding, spotting or leakage of amniotic fluid. Patient denies regular contractions.   Patient reports positive fetal movement.  37 year old pregnant female presents to presurgical testing scheduled for  Caesarean section - Breech Presentation .   Patient denies vaginal  bleeding, spotting or leakage of amniotic fluid. Patient denies regular contractions.   Patient reports positive fetal movement.  36 year old pregnant female presents to presurgical testing scheduled for  Caesarean section - Breech Presentation .   Patient denies vaginal  bleeding, spotting or leakage of amniotic fluid. Patient denies regular contractions.   Patient reports positive fetal movement.  36 year old pregnant female presents to presurgical testing scheduled for  Caesarean section - Breech Presentation .   Patient denies vaginal  bleeding, spotting or leakage of amniotic fluid. Patient denies regular contractions.   Patient reports positive fetal movement.   Pt says she had ECV ( external cephalic version ) on 11/04/2022 for breech presentation.

## 2022-11-15 ENCOUNTER — ASOB RESULT (OUTPATIENT)
Age: 37
End: 2022-11-15

## 2022-11-15 ENCOUNTER — APPOINTMENT (OUTPATIENT)
Dept: ANTEPARTUM | Facility: CLINIC | Age: 37
End: 2022-11-15

## 2022-11-15 PROCEDURE — 76818 FETAL BIOPHYS PROFILE W/NST: CPT

## 2022-11-22 ENCOUNTER — APPOINTMENT (OUTPATIENT)
Dept: ANTEPARTUM | Facility: CLINIC | Age: 37
End: 2022-11-22

## 2022-11-22 ENCOUNTER — ASOB RESULT (OUTPATIENT)
Age: 37
End: 2022-11-22

## 2022-11-22 ENCOUNTER — INPATIENT (INPATIENT)
Facility: HOSPITAL | Age: 37
LOS: 2 days | Discharge: HOME | End: 2022-11-25
Attending: OBSTETRICS & GYNECOLOGY | Admitting: OBSTETRICS & GYNECOLOGY

## 2022-11-22 VITALS — TEMPERATURE: 98 F

## 2022-11-22 DIAGNOSIS — Z98.890 OTHER SPECIFIED POSTPROCEDURAL STATES: Chronic | ICD-10-CM

## 2022-11-22 PROCEDURE — 76816 OB US FOLLOW-UP PER FETUS: CPT

## 2022-11-22 PROCEDURE — 76818 FETAL BIOPHYS PROFILE W/NST: CPT

## 2022-11-23 ENCOUNTER — TRANSCRIPTION ENCOUNTER (OUTPATIENT)
Age: 37
End: 2022-11-23

## 2022-11-23 LAB
BASOPHILS # BLD AUTO: 0.02 K/UL — SIGNIFICANT CHANGE UP (ref 0–0.2)
BASOPHILS NFR BLD AUTO: 0.3 % — SIGNIFICANT CHANGE UP (ref 0–2)
BLD GP AB SCN SERPL QL: NEGATIVE — SIGNIFICANT CHANGE UP
COVID-19 SPIKE DOMAIN AB INTERP: POSITIVE
COVID-19 SPIKE DOMAIN ANTIBODY RESULT: >250 U/ML — HIGH
EOSINOPHIL # BLD AUTO: 0.06 K/UL — SIGNIFICANT CHANGE UP (ref 0–0.5)
EOSINOPHIL NFR BLD AUTO: 0.8 % — SIGNIFICANT CHANGE UP (ref 0–6)
HCT VFR BLD CALC: 33.7 % — LOW (ref 34.5–45)
HGB BLD-MCNC: 11.2 G/DL — LOW (ref 11.5–15.5)
IANC: 4.33 K/UL — SIGNIFICANT CHANGE UP (ref 1.8–7.4)
IMM GRANULOCYTES NFR BLD AUTO: 0.6 % — SIGNIFICANT CHANGE UP (ref 0–0.9)
LYMPHOCYTES # BLD AUTO: 2.07 K/UL — SIGNIFICANT CHANGE UP (ref 1–3.3)
LYMPHOCYTES # BLD AUTO: 28.9 % — SIGNIFICANT CHANGE UP (ref 13–44)
MCHC RBC-ENTMCNC: 29.8 PG — SIGNIFICANT CHANGE UP (ref 27–34)
MCHC RBC-ENTMCNC: 33.2 GM/DL — SIGNIFICANT CHANGE UP (ref 32–36)
MCV RBC AUTO: 89.6 FL — SIGNIFICANT CHANGE UP (ref 80–100)
MONOCYTES # BLD AUTO: 0.64 K/UL — SIGNIFICANT CHANGE UP (ref 0–0.9)
MONOCYTES NFR BLD AUTO: 8.9 % — SIGNIFICANT CHANGE UP (ref 2–14)
NEUTROPHILS # BLD AUTO: 4.33 K/UL — SIGNIFICANT CHANGE UP (ref 1.8–7.4)
NEUTROPHILS NFR BLD AUTO: 60.5 % — SIGNIFICANT CHANGE UP (ref 43–77)
NRBC # BLD: 0 /100 WBCS — SIGNIFICANT CHANGE UP (ref 0–0)
NRBC # FLD: 0 K/UL — SIGNIFICANT CHANGE UP (ref 0–0)
PLATELET # BLD AUTO: 201 K/UL — SIGNIFICANT CHANGE UP (ref 150–400)
RBC # BLD: 3.76 M/UL — LOW (ref 3.8–5.2)
RBC # FLD: 14.6 % — HIGH (ref 10.3–14.5)
RH IG SCN BLD-IMP: POSITIVE — SIGNIFICANT CHANGE UP
SARS-COV-2 IGG+IGM SERPL QL IA: >250 U/ML — HIGH
SARS-COV-2 IGG+IGM SERPL QL IA: POSITIVE
SARS-COV-2 RNA SPEC QL NAA+PROBE: SIGNIFICANT CHANGE UP
T PALLIDUM AB TITR SER: NEGATIVE — SIGNIFICANT CHANGE UP
WBC # BLD: 7.16 K/UL — SIGNIFICANT CHANGE UP (ref 3.8–10.5)
WBC # FLD AUTO: 7.16 K/UL — SIGNIFICANT CHANGE UP (ref 3.8–10.5)

## 2022-11-23 RX ORDER — SIMETHICONE 80 MG/1
80 TABLET, CHEWABLE ORAL EVERY 4 HOURS
Refills: 0 | Status: DISCONTINUED | OUTPATIENT
Start: 2022-11-23 | End: 2022-11-25

## 2022-11-23 RX ORDER — MAGNESIUM HYDROXIDE 400 MG/1
30 TABLET, CHEWABLE ORAL
Refills: 0 | Status: DISCONTINUED | OUTPATIENT
Start: 2022-11-23 | End: 2022-11-25

## 2022-11-23 RX ORDER — OXYTOCIN 10 UNIT/ML
333.33 VIAL (ML) INJECTION
Qty: 20 | Refills: 0 | Status: DISCONTINUED | OUTPATIENT
Start: 2022-11-23 | End: 2022-11-24

## 2022-11-23 RX ORDER — DIBUCAINE 1 %
1 OINTMENT (GRAM) RECTAL EVERY 6 HOURS
Refills: 0 | Status: DISCONTINUED | OUTPATIENT
Start: 2022-11-23 | End: 2022-11-25

## 2022-11-23 RX ORDER — IBUPROFEN 200 MG
600 TABLET ORAL EVERY 6 HOURS
Refills: 0 | Status: DISCONTINUED | OUTPATIENT
Start: 2022-11-23 | End: 2022-11-25

## 2022-11-23 RX ORDER — PRAMOXINE HYDROCHLORIDE 150 MG/15G
1 AEROSOL, FOAM RECTAL EVERY 4 HOURS
Refills: 0 | Status: DISCONTINUED | OUTPATIENT
Start: 2022-11-23 | End: 2022-11-25

## 2022-11-23 RX ORDER — BENZOCAINE 10 %
1 GEL (GRAM) MUCOUS MEMBRANE EVERY 6 HOURS
Refills: 0 | Status: DISCONTINUED | OUTPATIENT
Start: 2022-11-23 | End: 2022-11-25

## 2022-11-23 RX ORDER — FAMOTIDINE 10 MG/ML
1 INJECTION INTRAVENOUS
Qty: 0 | Refills: 0 | DISCHARGE

## 2022-11-23 RX ORDER — ACETAMINOPHEN 500 MG
975 TABLET ORAL EVERY 6 HOURS
Refills: 0 | Status: COMPLETED | OUTPATIENT
Start: 2022-11-23 | End: 2023-10-22

## 2022-11-23 RX ORDER — TETANUS TOXOID, REDUCED DIPHTHERIA TOXOID AND ACELLULAR PERTUSSIS VACCINE, ADSORBED 5; 2.5; 8; 8; 2.5 [IU]/.5ML; [IU]/.5ML; UG/.5ML; UG/.5ML; UG/.5ML
0.5 SUSPENSION INTRAMUSCULAR ONCE
Refills: 0 | Status: DISCONTINUED | OUTPATIENT
Start: 2022-11-23 | End: 2022-11-25

## 2022-11-23 RX ORDER — OXYCODONE HYDROCHLORIDE 5 MG/1
5 TABLET ORAL
Refills: 0 | Status: DISCONTINUED | OUTPATIENT
Start: 2022-11-23 | End: 2022-11-25

## 2022-11-23 RX ORDER — SODIUM CHLORIDE 9 MG/ML
1000 INJECTION, SOLUTION INTRAVENOUS
Refills: 0 | Status: DISCONTINUED | OUTPATIENT
Start: 2022-11-23 | End: 2022-11-23

## 2022-11-23 RX ORDER — IBUPROFEN 200 MG
600 TABLET ORAL EVERY 6 HOURS
Refills: 0 | Status: COMPLETED | OUTPATIENT
Start: 2022-11-23 | End: 2023-10-22

## 2022-11-23 RX ORDER — ASPIRIN/CALCIUM CARB/MAGNESIUM 324 MG
0 TABLET ORAL
Qty: 0 | Refills: 0 | DISCHARGE

## 2022-11-23 RX ORDER — OXYTOCIN 10 UNIT/ML
2 VIAL (ML) INJECTION
Qty: 30 | Refills: 0 | Status: DISCONTINUED | OUTPATIENT
Start: 2022-11-23 | End: 2022-11-23

## 2022-11-23 RX ORDER — SODIUM CHLORIDE 9 MG/ML
3 INJECTION INTRAMUSCULAR; INTRAVENOUS; SUBCUTANEOUS EVERY 8 HOURS
Refills: 0 | Status: DISCONTINUED | OUTPATIENT
Start: 2022-11-23 | End: 2022-11-25

## 2022-11-23 RX ORDER — OXYTOCIN 10 UNIT/ML
333.33 VIAL (ML) INJECTION
Qty: 20 | Refills: 0 | Status: DISCONTINUED | OUTPATIENT
Start: 2022-11-23 | End: 2022-11-23

## 2022-11-23 RX ORDER — INFLUENZA VIRUS VACCINE 15; 15; 15; 15 UG/.5ML; UG/.5ML; UG/.5ML; UG/.5ML
0.5 SUSPENSION INTRAMUSCULAR ONCE
Refills: 0 | Status: COMPLETED | OUTPATIENT
Start: 2022-11-23 | End: 2022-11-23

## 2022-11-23 RX ORDER — DIPHENHYDRAMINE HCL 50 MG
25 CAPSULE ORAL EVERY 6 HOURS
Refills: 0 | Status: DISCONTINUED | OUTPATIENT
Start: 2022-11-23 | End: 2022-11-25

## 2022-11-23 RX ORDER — HYDROCORTISONE 1 %
1 OINTMENT (GRAM) TOPICAL EVERY 6 HOURS
Refills: 0 | Status: DISCONTINUED | OUTPATIENT
Start: 2022-11-23 | End: 2022-11-25

## 2022-11-23 RX ORDER — CHLORHEXIDINE GLUCONATE 213 G/1000ML
1 SOLUTION TOPICAL ONCE
Refills: 0 | Status: COMPLETED | OUTPATIENT
Start: 2022-11-23 | End: 2022-11-23

## 2022-11-23 RX ORDER — ACETAMINOPHEN 500 MG
1000 TABLET ORAL ONCE
Refills: 0 | Status: DISCONTINUED | OUTPATIENT
Start: 2022-11-23 | End: 2022-11-23

## 2022-11-23 RX ORDER — CITRIC ACID/SODIUM CITRATE 300-500 MG
15 SOLUTION, ORAL ORAL EVERY 6 HOURS
Refills: 0 | Status: DISCONTINUED | OUTPATIENT
Start: 2022-11-23 | End: 2022-11-23

## 2022-11-23 RX ORDER — LANOLIN
1 OINTMENT (GRAM) TOPICAL EVERY 6 HOURS
Refills: 0 | Status: DISCONTINUED | OUTPATIENT
Start: 2022-11-23 | End: 2022-11-25

## 2022-11-23 RX ORDER — ACETAMINOPHEN 500 MG
975 TABLET ORAL EVERY 6 HOURS
Refills: 0 | Status: DISCONTINUED | OUTPATIENT
Start: 2022-11-23 | End: 2022-11-25

## 2022-11-23 RX ORDER — OXYCODONE HYDROCHLORIDE 5 MG/1
5 TABLET ORAL ONCE
Refills: 0 | Status: DISCONTINUED | OUTPATIENT
Start: 2022-11-23 | End: 2022-11-25

## 2022-11-23 RX ORDER — AER TRAVELER 0.5 G/1
1 SOLUTION RECTAL; TOPICAL EVERY 4 HOURS
Refills: 0 | Status: DISCONTINUED | OUTPATIENT
Start: 2022-11-23 | End: 2022-11-25

## 2022-11-23 RX ADMIN — SODIUM CHLORIDE 3 MILLILITER(S): 9 INJECTION INTRAMUSCULAR; INTRAVENOUS; SUBCUTANEOUS at 21:38

## 2022-11-23 RX ADMIN — SODIUM CHLORIDE 125 MILLILITER(S): 9 INJECTION, SOLUTION INTRAVENOUS at 00:01

## 2022-11-23 RX ADMIN — Medication 1000 MILLIUNIT(S)/MIN: at 16:11

## 2022-11-23 RX ADMIN — CHLORHEXIDINE GLUCONATE 1 APPLICATION(S): 213 SOLUTION TOPICAL at 05:06

## 2022-11-23 RX ADMIN — Medication 2 MILLIUNIT(S)/MIN: at 10:58

## 2022-11-23 NOTE — OB RN PATIENT PROFILE - NS PRO AD ANY ON CHART
Was the patient seen in the last year in this department? Yes     Does patient have an active prescription for medications requested? No     Received Request Via: Pharmacy   Yes

## 2022-11-23 NOTE — OB PROVIDER LABOR PROGRESS NOTE - NS_ADDCERVICALEXAM_OBGYN_ALL_OB
Click to add…
How Severe Is Your Psoriasis?: moderate
Click to add…
Do You Have A Family History Of Psoriasis?: no
Is This A New Presentation, Or A Follow-Up?: Follow Up Psoriasis
Additional History: \\n\\nNew onset nail fungus of b/l great toes.
Click to add…

## 2022-11-23 NOTE — DISCHARGE NOTE OB - MEDICATION SUMMARY - MEDICATIONS TO TAKE
I will START or STAY ON the medications listed below when I get home from the hospital:    Prenatal 1 Plus 1 (Low Iron) oral tablet  -- 1 tab(s) by mouth once a day  -- Indication: For Home med

## 2022-11-23 NOTE — DISCHARGE NOTE OB - CARE PROVIDER_API CALL
Mariama Glass  OBSTETRICS AND GYNECOLOGY  [address], [address]  [place] [address]  Phone: [phone]  Fax: [phone]  Follow Up Time:

## 2022-11-23 NOTE — OB PROVIDER LABOR PROGRESS NOTE - NS_OBIHIFHRDETAILS_OBGYN_ALL_OB_FT
120/mod variability/no accels/+variables
125 bpm, moderate variability, +accels, occassional variables, overall reassuring
135bpm, moderate variability, +accels, no decels

## 2022-11-23 NOTE — OB RN DELIVERY SUMMARY - NS_SEPSISRSKCALC_OBGYN_ALL_OB_FT
EOS calculated successfully. EOS Risk Factor: 0.5/1000 live births ([facility] national incidence); GA=39w6d; Temp=98.24; ROM=10.517; GBS='Negative'; Antibiotics='No antibiotics or any antibiotics < 2 hrs prior to birth'

## 2022-11-23 NOTE — OB RN DELIVERY SUMMARY - NSSELHIDDEN_OBGYN_ALL_OB_FT
[NS_DeliveryAttending1_OBGYN_ALL_OB_FT:[id number]],[NS_DeliveryAssist1_OBGYN_ALL_OB_FT:[id number]],[NS_DeliveryRN_OBGYN_ALL_OB_FT:[id number]],[NS_CirculateRN2_OBGYN_ALL_OB_FT:[id number]]

## 2022-11-23 NOTE — DISCHARGE NOTE OB - MATERIALS PROVIDED
Vaccinations/[facility]  Screening Program/  Immunization Record/Breastfeeding Log/Breastfeeding Mother’s Support Group Information/Guide to Postpartum Care/[facility] Hearing Screen Program/Back To Sleep Handout/Shaken Baby Prevention Handout/Breastfeeding Guide and Packet/Birth Certificate Instructions/Discharge Medication Information for Patients and Families Pocket Guide

## 2022-11-23 NOTE — OB PROVIDER DELIVERY SUMMARY - NSPROVIDERDELIVERYNOTE_OBGYN_ALL_OB_FT
of viable infant.  Head, shoulders and body delivered easily.  Cord clamped and cut after delayed cord clamping was performed.   Placenta delivered intact.  Vaginal exam revealed intact cervix, vaginal walls and sulci.    degree laceration identified and repaired in usual fashion with 2-0 chromic suture.  Excellent hemostasis.

## 2022-11-23 NOTE — OB PROVIDER H&P - ASSESSMENT
A/P: 37y  at 39w6d GA who presents to L&D for rrIOL s/p ECV on   -Admit to L&D  -Consents signed  -Admission labs  -CLD  -IV fluids  -Labor: IOL with vaginal cytotec  -Fetus: Cat I tracing. Continuous toco and fetal monitoring.   -GBS: Negative, no GBS ppx required   - 2u hold for BMI 41.5    Discussed with Dr. Magali Mckeon PGY1

## 2022-11-23 NOTE — DISCHARGE NOTE OB - CARE PLAN
1 Principal Discharge DX:	Vaginal delivery  Assessment and plan of treatment:	Regular diet.  Resume normal activity as tolerated. Follow up in the office in 6 weeks for a postpartum visit.  No heavy lifting, driving, or strenuous activity for 2 weeks.  Nothing per vagina such as tampons, intercourse, douches or tub baths for 6 weeks or until you see your doctor.  Call your doctor with any signs and symptoms of infection such as fever, chills, nausea or vomiting.  Call your doctor if you're unable to tolerate food, increase in vaginal bleeding or have difficulty urinating.  Call your doctor if you have pain that is not relieved by your prescribed medications.  Notify your doctor with any other concerns.

## 2022-11-23 NOTE — CHART NOTE - NSCHARTNOTEFT_GEN_A_CORE
Patient seen for two decelerations.      First deceleration at 3:36am - 4min deceleration to 70s  Patient was seen and examined by myself.  Exam unchanged (3.5/70/-3).  IVF bolus given and patient repositioned with return of FHR to baseline 135, moderate variability, +accels    Second deceleration at 3:46am - 3 min deceleration to 90s  Bedside was myself, SURYA Doss PGY4, Dr. Gonsalez.  Exam performed by Dr. Gonsalez: exam unchanged.  AROM with scant clear fluid noted (called by RN for continued trickle of fluid after rupture), ISE attempted to be placed, but unable to place successfully.   Vaginal cytotec was not felt on vaginal exam.  Return to baseline with maternal repositioning.  135bpm, moderate variability, +accels, no decels    -Continue to monitor EFM  -Continue resusitation and maternal repositioning as needed  -No additional interventions at this time    Discussed with Dr. Magali Mckeon PGY1

## 2022-11-23 NOTE — OB PROVIDER H&P - NSICDXPASTMEDICALHX_GEN_ALL_CORE_FT
PAST MEDICAL HISTORY:  Fetus or  affected by breech delivery and extraction     HSV-1 (herpes simplex virus 1) infection     HSV-2 infection     Missed       PAST MEDICAL HISTORY:  Fetus or  affected by breech delivery and extraction     Missed

## 2022-11-23 NOTE — DISCHARGE NOTE OB - PATIENT PORTAL LINK FT
You can access the FollowMyHealth Patient Portal offered by [facility] by registering at the following website: [email or web address]. By joining [facility]’s FollowMyHealth portal, you will also be able to view your health information using other applications (apps) compatible with our system.

## 2022-11-23 NOTE — OB PROVIDER DELIVERY SUMMARY - NSLOWPPHRISK_OBGYN_A_OB
No previous uterine incision/Conrad Pregnancy/Less than or equal to 4 previous vaginal births/No known bleeding disorder/No history of postpartum hemorrhage/No other PPH risks indicated

## 2022-11-23 NOTE — OB PROVIDER H&P - NSHPPHYSICALEXAM_GEN_ALL_CORE
T(C): 36.7 (11-23-22 @ 00:23), Max: 36.7 (11-22-22 @ 23:12)  HR: 97 (11-23-22 @ 00:23) (97 - 97)  BP: 116/72 (11-23-22 @ 00:23) (116/72 - 116/72)  RR: 16 (11-23-22 @ 00:23) (16 - 16)  SpO2: --    Gen: NAD, well-appearing, AAOx3   Abd: Soft, gravid  Ext: non-tender, non-edematous  SVE:  3/70/-3  Bedside sono: vertex  FHT: 125bpm, moderate variability, +accels, no decels  [place]: irreg contractions

## 2022-11-23 NOTE — DISCHARGE NOTE OB - NS MD DC FALL RISK RISK
For information on Fall & Injury Prevention, visit: [email or web address] OR  [email or web address] OR  https://www.cdc.gov/steadi/patient.html

## 2022-11-23 NOTE — OB PROVIDER H&P - NS_PELVICEXAM_OBGYN_ALL_OB
[address], [address] [place] [address]    COLON DISCHARGE INSTRUCTIONS    Francisca Toro  [id number]  [address] [place] [address] St:   Your colonoscopy showed one small polyp I removed , rest of exam was normal.  Telephone # [phone]      Signed By: Rosana Nicolas Yes

## 2022-11-23 NOTE — OB PROVIDER H&P - HISTORY OF PRESENT ILLNESS
37y  at 39w6d GA who presents to L&D for rrIOL. Patient denies vaginal bleeding and leakage of fluid. She endorses good fetal movement and endorses intermittent mild contractions. Denies fevers, chills, nausea, vomiting, chest pain, SOB, dizziness and headache. No other complaints at this time.     JOHN: 24      Prenatal course is significant for: Breech presentation s/p ECV on      Obhx:  - 2008:  FT 7#8  - :  @26w PTL - 1#  - : IUFD @26w - unknown cause  - : MAB s/p D&C  Gynhx: -fibroids, -ovarian cysts, denies STD hx, denies abnormal PAPs   PMH: Acid reflux  PSH: Endoscopy , d&c   Soc hx: Denies EtOH, tobacco and illicit drug use during this pregnancy  Anx/dep:  Denies  Meds: PNV, ASA (alternating 162 and 81mg - last dose 4 days ago), famotidine  Allergies: NKDA  GBS: negative  EFW: 3262    Will accept blood transfusion   37y  at 39w6d GA who presents to L&D for rrIOL. Patient denies vaginal bleeding and leakage of fluid. She endorses good fetal movement and endorses intermittent mild contractions. Denies fevers, chills, nausea, vomiting, chest pain, SOB, dizziness and headache. No other complaints at this time.     JOHN: 24      Prenatal course is significant for: Breech presentation s/p ECV on      Obhx:  - 2008:  FT 7#8  - :  @26w PTL - 1#  - : IUFD @26w - unknown cause  - : MAB s/p D&C  Gynhx: -fibroids, -ovarian cysts, denies STD hx, denies abnormal PAPs, denies any vaginal lesions or HSV outbreaks   PMH: Acid reflux  PSH: Endoscopy , d&c   Soc hx: Denies EtOH, tobacco and illicit drug use during this pregnancy  Anx/dep:  Denies  Meds: PNV, ASA (alternating 162 and 81mg - last dose 4 days ago), famotidine  Allergies: NKDA  GBS: negative  EFW: 3262    Will accept blood transfusion

## 2022-11-23 NOTE — OB PROVIDER LABOR PROGRESS NOTE - NS_SUBJECTIVE/OBJECTIVE_OBGYN_ALL_OB_FT
Pt seen and evaluated for post epidural for fetal heart bradycardia, alice 60 bpm for total 8 min  Anesthesia paged, BP 96/57, O2 sat 92%, P96  O2 via NRBM applied  Pt repositioned, examined (unchanged)  Arroyo placed for CYU  Phenylephrine given by Anesthesia  ISE placed by Dr. Mckeon  Gradual return to baseline  Dr. Canada notified
Notified by RN of patient's c/o increased rectal pressure.
pt seen and examined at bedside c/o increased pressure
Delayed note 2/2 to clinical duties    Patient seen for placement of vaginal cytotec at 3:15am  VC placed without difficulty

## 2022-11-24 RX ORDER — IBUPROFEN 200 MG
1 TABLET ORAL
Qty: 0 | Refills: 0 | DISCHARGE
Start: 2022-11-24

## 2022-11-24 RX ORDER — ACETAMINOPHEN 500 MG
3 TABLET ORAL
Qty: 0 | Refills: 0 | DISCHARGE
Start: 2022-11-24

## 2022-11-24 RX ADMIN — Medication 975 MILLIGRAM(S): at 16:19

## 2022-11-24 RX ADMIN — Medication 600 MILLIGRAM(S): at 00:30

## 2022-11-24 RX ADMIN — Medication 600 MILLIGRAM(S): at 00:00

## 2022-11-24 RX ADMIN — Medication 975 MILLIGRAM(S): at 21:10

## 2022-11-24 RX ADMIN — Medication 975 MILLIGRAM(S): at 21:40

## 2022-11-24 RX ADMIN — Medication 975 MILLIGRAM(S): at 15:38

## 2022-11-24 RX ADMIN — Medication 600 MILLIGRAM(S): at 11:39

## 2022-11-24 RX ADMIN — Medication 1 TABLET(S): at 11:39

## 2022-11-24 RX ADMIN — Medication 600 MILLIGRAM(S): at 23:31

## 2022-11-24 RX ADMIN — Medication 600 MILLIGRAM(S): at 18:00

## 2022-11-24 RX ADMIN — Medication 600 MILLIGRAM(S): at 05:00

## 2022-11-24 RX ADMIN — Medication 600 MILLIGRAM(S): at 05:30

## 2022-11-24 RX ADMIN — SODIUM CHLORIDE 3 MILLILITER(S): 9 INJECTION INTRAMUSCULAR; INTRAVENOUS; SUBCUTANEOUS at 06:23

## 2022-11-24 RX ADMIN — Medication 600 MILLIGRAM(S): at 12:30

## 2022-11-24 RX ADMIN — SODIUM CHLORIDE 3 MILLILITER(S): 9 INJECTION INTRAMUSCULAR; INTRAVENOUS; SUBCUTANEOUS at 14:18

## 2022-11-24 NOTE — PROGRESS NOTE ADULT - SUBJECTIVE AND OBJECTIVE BOX
Attending Note     Pt seen and examined   no complaints   ambulating with no issues   voiding freely   minimal bleeding     Vital Signs Last 24 Hrs  T(C): 36.4 (2022 05:51), Max: 36.7 (2022 12:00)  T(F): 97.6 (2022 05:51), Max: 98.1 (2022 14:52)  HR: 87 (2022 05:51) (66 - 118)  BP: 107/61 (2022 05:51) (103/57 - 131/75)  BP(mean): --  RR: 18 (2022 05:51) (18 - 18)  SpO2: 98% (2022 05:51) (89% - 100%)    Parameters below as of 2022 21:03  Patient On (Oxygen Delivery Method): room air    Gen in NAD   Abd soft, fundus firm nontender  Perineum healing well   Ext: no c/c/e     A/P PPD 1 s/p    doing well   routine pp care  d/c instructions reviewed     CRYSTAL Payton

## 2022-11-25 VITALS
HEART RATE: 86 BPM | RESPIRATION RATE: 18 BRPM | OXYGEN SATURATION: 100 % | SYSTOLIC BLOOD PRESSURE: 112 MMHG | DIASTOLIC BLOOD PRESSURE: 81 MMHG | TEMPERATURE: 98 F

## 2022-11-25 RX ADMIN — SODIUM CHLORIDE 3 MILLILITER(S): 9 INJECTION INTRAMUSCULAR; INTRAVENOUS; SUBCUTANEOUS at 06:01

## 2022-11-25 RX ADMIN — Medication 1 TABLET(S): at 11:29

## 2022-11-25 RX ADMIN — Medication 600 MILLIGRAM(S): at 06:01

## 2022-11-25 RX ADMIN — Medication 600 MILLIGRAM(S): at 00:02

## 2022-11-25 RX ADMIN — Medication 600 MILLIGRAM(S): at 12:18

## 2022-11-25 RX ADMIN — Medication 600 MILLIGRAM(S): at 05:35

## 2022-11-25 RX ADMIN — SODIUM CHLORIDE 3 MILLILITER(S): 9 INJECTION INTRAMUSCULAR; INTRAVENOUS; SUBCUTANEOUS at 02:08

## 2022-11-25 RX ADMIN — Medication 600 MILLIGRAM(S): at 11:29

## 2023-01-26 ENCOUNTER — OUTPATIENT (OUTPATIENT)
Dept: OUTPATIENT SERVICES | Facility: HOSPITAL | Age: 38
LOS: 1 days | End: 2023-01-26

## 2023-01-26 VITALS
OXYGEN SATURATION: 100 % | HEART RATE: 86 BPM | HEIGHT: 61.5 IN | TEMPERATURE: 98 F | RESPIRATION RATE: 16 BRPM | DIASTOLIC BLOOD PRESSURE: 67 MMHG | WEIGHT: 207.01 LBS | SYSTOLIC BLOOD PRESSURE: 100 MMHG

## 2023-01-26 DIAGNOSIS — Z64.1 PROBLEMS RELATED TO MULTIPARITY: ICD-10-CM

## 2023-01-26 DIAGNOSIS — Z98.890 OTHER SPECIFIED POSTPROCEDURAL STATES: Chronic | ICD-10-CM

## 2023-01-26 LAB
BLD GP AB SCN SERPL QL: NEGATIVE — SIGNIFICANT CHANGE UP
HCG SERPL-ACNC: <5 MIU/ML — SIGNIFICANT CHANGE UP
HCT VFR BLD CALC: 40.6 % — SIGNIFICANT CHANGE UP (ref 34.5–45)
HGB BLD-MCNC: 13.1 G/DL — SIGNIFICANT CHANGE UP (ref 11.5–15.5)
MCHC RBC-ENTMCNC: 28.6 PG — SIGNIFICANT CHANGE UP (ref 27–34)
MCHC RBC-ENTMCNC: 32.3 GM/DL — SIGNIFICANT CHANGE UP (ref 32–36)
MCV RBC AUTO: 88.6 FL — SIGNIFICANT CHANGE UP (ref 80–100)
NRBC # BLD: 0 /100 WBCS — SIGNIFICANT CHANGE UP (ref 0–0)
NRBC # FLD: 0 K/UL — SIGNIFICANT CHANGE UP (ref 0–0)
PLATELET # BLD AUTO: 289 K/UL — SIGNIFICANT CHANGE UP (ref 150–400)
RBC # BLD: 4.58 M/UL — SIGNIFICANT CHANGE UP (ref 3.8–5.2)
RBC # FLD: 13.3 % — SIGNIFICANT CHANGE UP (ref 10.3–14.5)
RH IG SCN BLD-IMP: POSITIVE — SIGNIFICANT CHANGE UP
WBC # BLD: 4.77 K/UL — SIGNIFICANT CHANGE UP (ref 3.8–10.5)
WBC # FLD AUTO: 4.77 K/UL — SIGNIFICANT CHANGE UP (ref 3.8–10.5)

## 2023-01-26 NOTE — H&P PST ADULT - PAIN ASSESSMENT COMPLETED
Monitor: The patient's heart failure is compensated .  Evaluation: Reviewed recent labs/diagnostic tests with the patient.  Assessment/Treatment:  Continue current treatment/monitoring regimen.  Condition will be reassessed per progress note   Low salt diet   Start exercise with     Yes

## 2023-01-26 NOTE — H&P PST ADULT - HISTORY OF PRESENT ILLNESS
36 y/o female presents to presurgical testing with diagnosis of problems related to multiparity scheduled for a laparoscopic bilateral tubal ligation. Pt had a vaginal delivery on 11/23/22, has three children at home, and reports her family is complete.

## 2023-01-26 NOTE — H&P PST ADULT - PROBLEM SELECTOR PLAN 1
Patient tentatively scheduled for laparoscopic bilateral tubal ligation for 2/10/23. Pre-op instructions provided. Pt given verbal and written instructions with teach back on chlorhexidine shampoo. Pt will take own famotidine on the morning of procedure for GI prophylaxis. Pt verbalized understanding with return demonstration.     Pt instructed to obtain a COVID-19 PCR 3-5 days prior to the procedure. COVID-19 PCR order placed. Pt was provided with a list of COVID-19 PCR swabbing locations and hours of operation. Pt stated understanding.    CBC T&S HCG done.

## 2023-01-26 NOTE — H&P PST ADULT - NSICDXFAMILYHX_GEN_ALL_CORE_FT
From: Leora Barron  To: Benjamin Swan MD  Sent: 10/31/2019 7:20 AM CDT  Subject: Other    Dr. SY,  I'm sorry to bother you, but on Monday my [place] agnieszka tripped me outdoors and I landed on my side with my elbow saranya into my right side rib cage. I'm in pain, but do you do anything for this? how long does it take to get better? It really hurts. Could you let me know.   Thanks  Leora   FAMILY HISTORY:  Father  Still living? No  Family history of diabetes mellitus (DM), Age at diagnosis: Age Unknown  FH: heart attack, Age at diagnosis: Age Unknown  FH: heart disease, Age at diagnosis: Age Unknown  FH: pancreatic cancer, Age at diagnosis: Age Unknown  FH: stroke, Age at diagnosis: Age Unknown    Mother  Still living? Yes, Estimated age: Age Unknown  Family history of diabetes mellitus (DM), Age at diagnosis: Age Unknown    Sibling  Still living? Yes, Estimated age: Age Unknown  FH: breast cancer, Age at diagnosis: Age Unknown    Grandparent  Still living? No  FH: pancreatic cancer, Age at diagnosis: Age Unknown

## 2023-02-07 LAB — SARS-COV-2 RNA SPEC QL NAA+PROBE: SIGNIFICANT CHANGE UP

## 2023-02-09 ENCOUNTER — TRANSCRIPTION ENCOUNTER (OUTPATIENT)
Age: 38
End: 2023-02-09

## 2023-02-10 ENCOUNTER — TRANSCRIPTION ENCOUNTER (OUTPATIENT)
Age: 38
End: 2023-02-10

## 2023-02-10 ENCOUNTER — OUTPATIENT (OUTPATIENT)
Dept: OUTPATIENT SERVICES | Facility: HOSPITAL | Age: 38
LOS: 1 days | Discharge: HOME | End: 2023-02-10
Payer: COMMERCIAL

## 2023-02-10 VITALS — OXYGEN SATURATION: 98 % | HEART RATE: 69 BPM | RESPIRATION RATE: 12 BRPM

## 2023-02-10 VITALS
RESPIRATION RATE: 18 BRPM | DIASTOLIC BLOOD PRESSURE: 66 MMHG | OXYGEN SATURATION: 100 % | SYSTOLIC BLOOD PRESSURE: 99 MMHG | HEIGHT: 61.5 IN | TEMPERATURE: 98 F | WEIGHT: 207.01 LBS | HEART RATE: 79 BPM

## 2023-02-10 DIAGNOSIS — Z64.1 PROBLEMS RELATED TO MULTIPARITY: ICD-10-CM

## 2023-02-10 DIAGNOSIS — Z98.890 OTHER SPECIFIED POSTPROCEDURAL STATES: Chronic | ICD-10-CM

## 2023-02-10 LAB — HCG UR QL: NEGATIVE — SIGNIFICANT CHANGE UP

## 2023-02-10 PROCEDURE — 88302 TISSUE EXAM BY PATHOLOGIST: CPT | Mod: 26

## 2023-02-10 RX ORDER — SODIUM CHLORIDE 9 MG/ML
1000 INJECTION, SOLUTION INTRAVENOUS
Refills: 0 | Status: DISCONTINUED | OUTPATIENT
Start: 2023-02-10 | End: 2023-02-10

## 2023-02-10 RX ORDER — OXYCODONE HYDROCHLORIDE 5 MG/1
1 TABLET ORAL
Qty: 5 | Refills: 0
Start: 2023-02-10

## 2023-02-10 RX ORDER — FENTANYL CITRATE 50 UG/ML
50 INJECTION INTRAVENOUS
Refills: 0 | Status: DISCONTINUED | OUTPATIENT
Start: 2023-02-10 | End: 2023-02-10

## 2023-02-10 RX ORDER — FENTANYL CITRATE 50 UG/ML
25 INJECTION INTRAVENOUS
Refills: 0 | Status: DISCONTINUED | OUTPATIENT
Start: 2023-02-10 | End: 2023-02-10

## 2023-02-10 RX ORDER — ONDANSETRON 8 MG/1
4 TABLET, FILM COATED ORAL ONCE
Refills: 0 | Status: DISCONTINUED | OUTPATIENT
Start: 2023-02-10 | End: 2023-02-25

## 2023-02-10 RX ORDER — FAMOTIDINE 10 MG/ML
1 INJECTION INTRAVENOUS
Qty: 0 | Refills: 0 | DISCHARGE

## 2023-02-10 RX ADMIN — FENTANYL CITRATE 50 MICROGRAM(S): 50 INJECTION INTRAVENOUS at 17:48

## 2023-02-10 RX ADMIN — FENTANYL CITRATE 50 MICROGRAM(S): 50 INJECTION INTRAVENOUS at 17:05

## 2023-02-10 RX ADMIN — FENTANYL CITRATE 50 MICROGRAM(S): 50 INJECTION INTRAVENOUS at 18:00

## 2023-02-10 RX ADMIN — FENTANYL CITRATE 50 MICROGRAM(S): 50 INJECTION INTRAVENOUS at 17:30

## 2023-02-10 NOTE — ASU DISCHARGE PLAN (ADULT/PEDIATRIC) - NURSING INSTRUCTIONS
Last dose of TYLENOL for pain management was at 4pm. Next dose of TYLENOL may be taken at or after 10pm if needed. DO NOT take any additional products containing TYLENOL or ACETAMINOPHEN, such as VICODIN, PERCOCET, NORCO, EXCEDRIN, and any over-the-counter cold medications. DO NOT CONSUME MORE THAN 2922-1698 MG OF TYLENOL (acetaminophen) in a 24-hour period.

## 2023-02-10 NOTE — ASU PATIENT PROFILE, ADULT - FALL HARM RISK - UNIVERSAL INTERVENTIONS
Bed in lowest position, wheels locked, appropriate side rails in place/Call bell, personal items and telephone in reach/Instruct patient to call for assistance before getting out of bed or chair/Non-slip footwear when patient is out of bed/[place] to call system/Physically safe environment - no spills, clutter or unnecessary equipment/Purposeful Proactive Rounding/Room/bathroom lighting operational, light cord in reach

## 2023-02-10 NOTE — ASU DISCHARGE PLAN (ADULT/PEDIATRIC) - CARE PROVIDER_API CALL
Mariama Glass  OBSTETRICS AND GYNECOLOGY  [address], [address]  [place] [address]  Phone: [phone]  Fax: [phone]  Follow Up Time: 2 weeks

## 2023-02-10 NOTE — BRIEF OPERATIVE NOTE - OPERATION/FINDINGS
Grossly normal appearing liver, liver edge and bowel.   Grossly normal uterus and bilateral fallopian tubes and ovaries. Grossly normal appearing liver, omentum and bowel.   Grossly normal uterus and bilateral fallopian tubes and ovaries.

## 2023-02-10 NOTE — CHART NOTE - NSCHARTNOTEFT_GEN_A_CORE
R2 OBGYN POST-OP CHECK    S: Patient seen and evaluated at bedside.  Pt awake and alert resting comfortably in bed  Patient reports pain controlled with analgesia. Pt denies N/V, SOB, CP, palpitations, fever/chills. Tolerating clears.  Not OOB yet.    O:   T(C): 36.3 (02-10-23 @ 18:00), Max: 36.3 (02-10-23 @ 18:00)  HR: 67 (02-10-23 @ 18:00) (66 - 82)  BP: 120/64 (02-10-23 @ 18:00) (102/61 - 120/64)  RR: 15 (02-10-23 @ 18:00) (15 - 17)  SpO2: 97% (02-10-23 @ 18:00) (94% - 100%)  Wt(kg): --  I&O's Summary    10 Feb 2023 07:01  -  10 Feb 2023 19:06  --------------------------------------------------------  IN: 420 mL / OUT: 0 mL / NET: 420 mL        Gen: Resting comfortably in bed, NAD  CV: S1S2, RRR  Lungs: CTA B/L  Abd: soft, appropriately tender, occasional BS x 4 quadrants.    Inc: 3 LSC incisions clean/dry/intact w/ bandage in place  Ext: SCD's in place and functional, non-tender b/l, no edema        A/P: 37y Female s/p laparoscopic bilateral salpingectomy. Patient is doing well post-operatively.    Neuro: PO Analgesia PRN    CV: Hemodynamically stable.  Monitor VS.  Pulm: Saturating well on room air.  Encourage OOB and incentive spirometer use.   GI: Advance to regular diet. Anti-emetics PRN.  : DTV by 10:30p  FEN: Electrolytes: SLIV  Heme: DVT ppx w/ SCD's while in bed. Early ambulation, initially with assistance then as tolerated.     ID: Afebrile  Endo: No active issues   Dispo: Discharge from PACU when criteria met.     SURYA Cordero, PGY2

## 2023-02-21 LAB — SURGICAL PATHOLOGY STUDY: SIGNIFICANT CHANGE UP

## 2023-07-25 ENCOUNTER — APPOINTMENT (OUTPATIENT)
Dept: ORTHOPEDIC SURGERY | Facility: CLINIC | Age: 38
End: 2023-07-25
Payer: COMMERCIAL

## 2023-07-25 VITALS — HEIGHT: 62 IN | BODY MASS INDEX: 34.96 KG/M2 | WEIGHT: 190 LBS

## 2023-07-25 DIAGNOSIS — Z87.19 PERSONAL HISTORY OF OTHER DISEASES OF THE DIGESTIVE SYSTEM: ICD-10-CM

## 2023-07-25 PROCEDURE — 99203 OFFICE O/P NEW LOW 30 MIN: CPT

## 2023-07-25 PROCEDURE — 73562 X-RAY EXAM OF KNEE 3: CPT | Mod: RT

## 2023-07-25 RX ORDER — OMEPRAZOLE 40 MG/1
CAPSULE, DELAYED RELEASE ORAL
Refills: 0 | Status: ACTIVE | COMMUNITY

## 2023-07-25 NOTE — IMAGING
[de-identified] : right knee:\par no lateral joint line ttp\par medial joint line ttp\par 5/5 strength\par positive Nena medial meniscus\par nvid\par  [Right] : right knee [Mild tricompartmental OA medial narrowing] : Mild tricompartmental OA medial narrowing

## 2023-07-25 NOTE — HISTORY OF PRESENT ILLNESS
[Dull/Aching] : dull/aching [Walking] : walking [Stairs] : stairs [Full time] : Work status: full time [4] : 4 [0] : 0 [de-identified] : 7/25/23:  Pt has had pain in her right knee for a week.  Pt denies trauma. [] : Post Surgical Visit: no [FreeTextEntry1] : right knee [FreeTextEntry5] : Patient is here today for ongoing right knee pain that started 1 week ago. Patient states occasional numbness in right thigh.  [de-identified] : prolonged sitting [de-identified] : none

## 2023-07-25 NOTE — ASSESSMENT
[FreeTextEntry1] : The patient was advised of the diagnosis. The natural history of the pathology was explained in full to the patient in layman's terms. All questions were answered. The risks and benefits of surgical and non-surgical treatment alternatives were explained in full to the patient.\par \par Start PT\par F/u in 6 weeks

## 2023-09-05 ENCOUNTER — NON-APPOINTMENT (OUTPATIENT)
Age: 38
End: 2023-09-05

## 2023-09-05 ENCOUNTER — APPOINTMENT (OUTPATIENT)
Dept: ORTHOPEDIC SURGERY | Facility: CLINIC | Age: 38
End: 2023-09-05
Payer: COMMERCIAL

## 2023-09-05 VITALS — HEIGHT: 62 IN | BODY MASS INDEX: 34.96 KG/M2 | WEIGHT: 190 LBS

## 2023-09-05 PROCEDURE — 99213 OFFICE O/P EST LOW 20 MIN: CPT

## 2023-09-05 NOTE — HISTORY OF PRESENT ILLNESS
[4] : 4 [0] : 0 [Dull/Aching] : dull/aching [Walking] : walking [Stairs] : stairs [Full time] : Work status: full time [de-identified] : 9/5/23: knee pain not better. has trouble pushing off on stairs and pain if she falls asleep with it bent- it doesnt straighten easily.  7/25/23:  Pt has had pain in her right knee for a week.  Pt denies trauma. [] : Post Surgical Visit: no [FreeTextEntry1] : right knee [FreeTextEntry5] : Patient is here today for ongoing right knee pain that started 1 week ago. Patient states occasional numbness in right thigh.  [de-identified] : prolonged sitting [de-identified] : none

## 2023-09-05 NOTE — IMAGING
[Right] : right knee [Mild tricompartmental OA medial narrowing] : Mild tricompartmental OA medial narrowing [de-identified] : right knee:\par  no lateral joint line ttp\par  medial joint line ttp\par  5/5 strength\par  positive Nena medial meniscus\par  nvid\par

## 2023-09-05 NOTE — ASSESSMENT
[FreeTextEntry1] : The patient was advised of the diagnosis. The natural history of the pathology was explained in full to the patient in layman's terms. All questions were answered. The risks and benefits of surgical and non-surgical treatment alternatives were explained in full to the patient. NSAIDs recommended.  Patient warned of risk of NSAID medication to stomach and GI tract, risk of increase blood pressure, cardiac risk, and risk of fluid retention.  The patient should clear taking medication with internist/PMD if any problem with heart, blood pressure, or GI system exists. The patient was advised to apply ice (wrapped in a towel or protective covering) to the area daily (20 minutes at a time, 2-4X/day recommend PT

## 2023-10-30 ENCOUNTER — NON-APPOINTMENT (OUTPATIENT)
Age: 38
End: 2023-10-30

## 2023-10-31 ENCOUNTER — APPOINTMENT (OUTPATIENT)
Dept: ORTHOPEDIC SURGERY | Facility: CLINIC | Age: 38
End: 2023-10-31
Payer: COMMERCIAL

## 2023-10-31 VITALS — HEIGHT: 62 IN | BODY MASS INDEX: 34.96 KG/M2 | WEIGHT: 190 LBS

## 2023-10-31 DIAGNOSIS — S83.241A OTHER TEAR OF MEDIAL MENISCUS, CURRENT INJURY, RIGHT KNEE, INITIAL ENCOUNTER: ICD-10-CM

## 2023-10-31 DIAGNOSIS — M21.41 FLAT FOOT [PES PLANUS] (ACQUIRED), RIGHT FOOT: ICD-10-CM

## 2023-10-31 DIAGNOSIS — M21.42 FLAT FOOT [PES PLANUS] (ACQUIRED), RIGHT FOOT: ICD-10-CM

## 2023-10-31 DIAGNOSIS — M22.2X1 PATELLOFEMORAL DISORDERS, RIGHT KNEE: ICD-10-CM

## 2023-10-31 PROCEDURE — 99213 OFFICE O/P EST LOW 20 MIN: CPT

## 2024-08-05 ENCOUNTER — APPOINTMENT (OUTPATIENT)
Dept: ORTHOPEDIC SURGERY | Facility: CLINIC | Age: 39
End: 2024-08-05

## 2024-08-05 ENCOUNTER — NON-APPOINTMENT (OUTPATIENT)
Age: 39
End: 2024-08-05

## 2024-08-05 PROBLEM — M25.461 EFFUSION OF RIGHT KNEE: Status: ACTIVE | Noted: 2024-08-05

## 2024-08-05 PROBLEM — S83.211A BUCKET-HANDLE TEAR OF MEDIAL MENISCUS OF RIGHT KNEE AS CURRENT INJURY, INITIAL ENCOUNTER: Status: ACTIVE | Noted: 2024-08-05

## 2024-08-05 PROBLEM — S93.491A SPRAIN OF OTHER LIGAMENT OF RIGHT ANKLE, INITIAL ENCOUNTER: Status: ACTIVE | Noted: 2024-08-05

## 2024-08-05 PROCEDURE — 99213 OFFICE O/P EST LOW 20 MIN: CPT

## 2024-08-05 PROCEDURE — 73610 X-RAY EXAM OF ANKLE: CPT | Mod: RT

## 2024-08-05 PROCEDURE — 73562 X-RAY EXAM OF KNEE 3: CPT | Mod: RT

## 2024-08-05 NOTE — ASSESSMENT
[FreeTextEntry1] : The patient was advised of the diagnosis. The natural history of the pathology was explained in full to the patient in layman's terms. All questions were answered. The risks and benefits of surgical and non-surgical treatment alternatives were explained in full to the patient.  naproxen rx NSAIDs recommended.  Patient warned of risk of NSAID medication to stomach and GI tract, risk of increase blood pressure, cardiac risk, and risk of fluid retention.  The patient should clear taking medication with internist/PMD if any problem with heart, blood pressure, or GI system exists.  ACE, RICE to ankle  MRI right knee- buckling, effusion on xray, +McMurrays

## 2024-08-05 NOTE — IMAGING
[de-identified] : right ankle-  ankle skin intact min swelling about the lateral ankle good rom toes min TTP about the ATFL no ecchymosis about the ankle No opening to stress FAROM NVID   Xray 3 view ankle- no fractures  knee- skin intact min swelling farom + MJLT +McMurrays NVI  Xray of chela right knee 3 views- effusion noted

## 2024-08-05 NOTE — HISTORY OF PRESENT ILLNESS
[4] : 4 [0] : 0 [Dull/Aching] : dull/aching [Walking] : walking [Stairs] : stairs [Full time] : Work status: full time [de-identified] : 8/5/24: twisted ankle about a month ago, ankle feels better, but now the knee and ankle seem swollen buckling of the right knee reported  10/31/2023: Follow up for right knee.  knee is better since last visit but still has pain. Pt denies knee instability  9/5/23: knee pain not better. has trouble pushing off on stairs and pain if she falls asleep with it bent- it doesnt straighten easily.  7/25/23:  Pt has had pain in her right knee for a week.  Pt denies trauma. [] : Post Surgical Visit: no [FreeTextEntry1] : right knee [FreeTextEntry5] : Patient is here today for ongoing right knee pain that started 1 week ago. Patient states occasional numbness in right thigh.  [de-identified] : prolonged sitting [de-identified] : none

## 2024-08-05 NOTE — HISTORY OF PRESENT ILLNESS
[4] : 4 [0] : 0 [Dull/Aching] : dull/aching [Walking] : walking [Stairs] : stairs [Full time] : Work status: full time [de-identified] : 8/5/24: twisted ankle about a month ago, ankle feels better, but now the knee and ankle seem swollen buckling of the right knee reported  10/31/2023: Follow up for right knee.  knee is better since last visit but still has pain. Pt denies knee instability  9/5/23: knee pain not better. has trouble pushing off on stairs and pain if she falls asleep with it bent- it doesnt straighten easily.  7/25/23:  Pt has had pain in her right knee for a week.  Pt denies trauma. [] : Post Surgical Visit: no [FreeTextEntry1] : right knee [FreeTextEntry5] : Patient is here today for ongoing right knee pain that started 1 week ago. Patient states occasional numbness in right thigh.  [de-identified] : prolonged sitting [de-identified] : none

## 2024-08-05 NOTE — IMAGING
[de-identified] : right ankle-  ankle skin intact min swelling about the lateral ankle good rom toes min TTP about the ATFL no ecchymosis about the ankle No opening to stress FAROM NVID   Xray 3 view ankle- no fractures  knee- skin intact min swelling farom + MJLT +McMurrays NVI  Xray of chela right knee 3 views- effusion noted

## 2024-08-16 ENCOUNTER — APPOINTMENT (OUTPATIENT)
Dept: MRI IMAGING | Facility: CLINIC | Age: 39
End: 2024-08-16
Payer: COMMERCIAL

## 2024-08-16 PROCEDURE — 73721 MRI JNT OF LWR EXTRE W/O DYE: CPT | Mod: RT

## 2024-08-27 ENCOUNTER — APPOINTMENT (OUTPATIENT)
Dept: ORTHOPEDIC SURGERY | Facility: CLINIC | Age: 39
End: 2024-08-27
Payer: COMMERCIAL

## 2024-08-27 ENCOUNTER — NON-APPOINTMENT (OUTPATIENT)
Age: 39
End: 2024-08-27

## 2024-08-27 DIAGNOSIS — M22.2X1 PATELLOFEMORAL DISORDERS, RIGHT KNEE: ICD-10-CM

## 2024-08-27 DIAGNOSIS — S83.211A BUCKET-HANDLE TEAR OF MEDIAL MENISCUS, CURRENT INJURY, RIGHT KNEE, INITIAL ENCOUNTER: ICD-10-CM

## 2024-08-27 PROCEDURE — 99214 OFFICE O/P EST MOD 30 MIN: CPT

## 2024-08-27 NOTE — IMAGING
[de-identified] : knee- skin intact min swelling farom + MJLT +McMurrays NVI  MRI Right knee images reviewed.  My independent interpretation of the MRI: normal, no ligament, meniscus or cartlage injury

## 2024-08-27 NOTE — HISTORY OF PRESENT ILLNESS
[4] : 4 [0] : 0 [Dull/Aching] : dull/aching [Walking] : walking [Stairs] : stairs [Full time] : Work status: full time [de-identified] : 8/27/24: Pt reports continued pain in the right knee.   8/5/24: twisted ankle about a month ago, ankle feels better, but now the knee and ankle seem swollen buckling of the right knee reported  10/31/2023: Follow up for right knee.  knee is better since last visit but still has pain. Pt denies knee instability  9/5/23: knee pain not better. has trouble pushing off on stairs and pain if she falls asleep with it bent- it doesnt straighten easily.  7/25/23:  Pt has had pain in her right knee for a week.  Pt denies trauma. [] : Post Surgical Visit: no [FreeTextEntry1] : right knee [FreeTextEntry5] : Patient is here today for ongoing right knee pain that started 1 week ago. Patient states occasional numbness in right thigh.  [de-identified] : prolonged sitting [de-identified] : none

## 2024-08-27 NOTE — ASSESSMENT
[FreeTextEntry1] : The patient was advised of the diagnosis. The natural history of the pathology was explained in full to the patient in layman's terms. All questions were answered. The risks and benefits of surgical and non-surgical treatment alternatives were explained in full to the patient.  start with pt  f/u 4 weeks

## (undated) DEVICE — PACK PERI GYN

## (undated) DEVICE — SOL IRR POUR NS 0.9% 500ML

## (undated) DEVICE — CANISTER DISPOSABLE THIN WALL 3000CC

## (undated) DEVICE — UTERINE MANIPULATOR ACMI HUMI 4.5MM

## (undated) DEVICE — LIGASURE BLUNT TIP 37CM

## (undated) DEVICE — BLADE SURGICAL #15 CARBON

## (undated) DEVICE — TROCAR COVIDIEN VERSAPORT BLADELESS OPTICAL 5MM STANDARD

## (undated) DEVICE — ELCTR GROUNDING PAD ADULT COVIDIEN

## (undated) DEVICE — PROTECTOR HEEL / ELBOW FLUFFY

## (undated) DEVICE — VENODYNE/SCD SLEEVE CALF MEDIUM

## (undated) DEVICE — SUT VICRYL 0 27" UR-6

## (undated) DEVICE — ANESTHESIA CIRCUIT ADULT HMEF

## (undated) DEVICE — LABELS BLANK W PEN

## (undated) DEVICE — GLV 7 PROTEXIS (WHITE)

## (undated) DEVICE — TROCAR COVIDIEN VERSAONE BLUNT TIP HASSAN 12MM

## (undated) DEVICE — SUT MONOCRYL 4-0 27" PS-2 UNDYED

## (undated) DEVICE — FOLEY TRAY 16FR 5CC LF UMETER CLOSED

## (undated) DEVICE — PACK GENERAL LAPAROSCOPY

## (undated) DEVICE — BASIN SET DOUBLE

## (undated) DEVICE — WARMING BLANKET FULL ADULT

## (undated) DEVICE — PREP BETADINE KIT

## (undated) DEVICE — TUBING OLYMPUS INSUFFLATION

## (undated) DEVICE — UTERINE MANIPULATOR COOPER SURGICAL 5MM 33CM GREEN